# Patient Record
Sex: FEMALE | Race: BLACK OR AFRICAN AMERICAN | NOT HISPANIC OR LATINO | Employment: UNEMPLOYED | URBAN - METROPOLITAN AREA
[De-identification: names, ages, dates, MRNs, and addresses within clinical notes are randomized per-mention and may not be internally consistent; named-entity substitution may affect disease eponyms.]

---
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
## 2025-06-07 ENCOUNTER — HOSPITAL ENCOUNTER (EMERGENCY)
Facility: HOSPITAL | Age: 20
Discharge: HOME | End: 2025-06-07
Attending: STUDENT IN AN ORGANIZED HEALTH CARE EDUCATION/TRAINING PROGRAM
Payer: COMMERCIAL

## 2025-06-07 VITALS
HEART RATE: 70 BPM | HEIGHT: 67 IN | TEMPERATURE: 98 F | BODY MASS INDEX: 26.68 KG/M2 | OXYGEN SATURATION: 99 % | RESPIRATION RATE: 18 BRPM | WEIGHT: 170 LBS | SYSTOLIC BLOOD PRESSURE: 132 MMHG | DIASTOLIC BLOOD PRESSURE: 68 MMHG

## 2025-06-07 DIAGNOSIS — B96.89 BACTERIAL VAGINOSIS: ICD-10-CM

## 2025-06-07 DIAGNOSIS — R68.89 MULTIPLE COMPLAINTS: ICD-10-CM

## 2025-06-07 DIAGNOSIS — N30.00 ACUTE CYSTITIS WITHOUT HEMATURIA: ICD-10-CM

## 2025-06-07 DIAGNOSIS — A59.01 TRICHOMONAS VAGINITIS: ICD-10-CM

## 2025-06-07 DIAGNOSIS — N76.0 BACTERIAL VAGINOSIS: ICD-10-CM

## 2025-06-07 DIAGNOSIS — K80.20 CALCULUS OF GALLBLADDER WITHOUT CHOLECYSTITIS WITHOUT OBSTRUCTION: Primary | ICD-10-CM

## 2025-06-07 DIAGNOSIS — R94.31 ABNORMAL EKG: ICD-10-CM

## 2025-06-07 LAB
ABSOLUTE EOSINOPHIL (OHS): 0.29 K/UL
ABSOLUTE MONOCYTE (OHS): 0.85 K/UL (ref 0.3–1)
ABSOLUTE NEUTROPHIL COUNT (OHS): 6.69 K/UL (ref 1.8–7.7)
ALBUMIN SERPL BCP-MCNC: 3.9 G/DL (ref 3.5–5.2)
ALP SERPL-CCNC: 60 UNIT/L (ref 40–150)
ALT SERPL W/O P-5'-P-CCNC: 6 UNIT/L (ref 10–44)
ANION GAP (OHS): 8 MMOL/L (ref 8–16)
AST SERPL-CCNC: 15 UNIT/L (ref 11–45)
B-HCG UR QL: NEGATIVE
BACTERIA #/AREA URNS AUTO: ABNORMAL /HPF
BACTERIA GENITAL QL WET PREP: ABNORMAL
BASOPHILS # BLD AUTO: 0.03 K/UL
BASOPHILS NFR BLD AUTO: 0.3 %
BILIRUB SERPL-MCNC: 0.2 MG/DL (ref 0.1–1)
BILIRUB UR QL STRIP.AUTO: NEGATIVE
BUN SERPL-MCNC: 3 MG/DL (ref 6–30)
BUN SERPL-MCNC: 5 MG/DL (ref 6–20)
CALCIUM SERPL-MCNC: 9 MG/DL (ref 8.7–10.5)
CHLORIDE SERPL-SCNC: 101 MMOL/L (ref 95–110)
CHLORIDE SERPL-SCNC: 106 MMOL/L (ref 95–110)
CLARITY UR: ABNORMAL
CLUE CELLS VAG QL WET PREP: ABNORMAL
CO2 SERPL-SCNC: 27 MMOL/L (ref 23–29)
COLOR UR AUTO: YELLOW
CREAT SERPL-MCNC: 0.8 MG/DL (ref 0.5–1.4)
CREAT SERPL-MCNC: 0.8 MG/DL (ref 0.5–1.4)
CTP QC/QA: YES
ERYTHROCYTE [DISTWIDTH] IN BLOOD BY AUTOMATED COUNT: 13.5 % (ref 11.5–14.5)
FILAMENT FUNGI VAG WET PREP-#/AREA: ABNORMAL
GFR SERPLBLD CREATININE-BSD FMLA CKD-EPI: >60 ML/MIN/1.73/M2
GLUCOSE SERPL-MCNC: 76 MG/DL (ref 70–110)
GLUCOSE SERPL-MCNC: 79 MG/DL (ref 70–110)
GLUCOSE UR QL STRIP: NEGATIVE
HCT VFR BLD AUTO: 41.7 % (ref 37–48.5)
HCT VFR BLD CALC: 45 %PCV (ref 36–54)
HCV AB SERPL QL IA: NORMAL
HGB BLD-MCNC: 13.9 GM/DL (ref 12–16)
HGB UR QL STRIP: NEGATIVE
HIV 1+2 AB+HIV1 P24 AG SERPL QL IA: NORMAL
HOLD SPECIMEN: NORMAL
HOLD SPECIMEN: NORMAL
IMM GRANULOCYTES # BLD AUTO: 0.03 K/UL (ref 0–0.04)
IMM GRANULOCYTES NFR BLD AUTO: 0.3 % (ref 0–0.5)
KETONES UR QL STRIP: NEGATIVE
LEUKOCYTE ESTERASE UR QL STRIP: ABNORMAL
LIPASE SERPL-CCNC: 28 U/L (ref 4–60)
LYMPHOCYTES # BLD AUTO: 1.99 K/UL (ref 1–4.8)
MCH RBC QN AUTO: 32.8 PG (ref 27–31)
MCHC RBC AUTO-ENTMCNC: 33.3 G/DL (ref 32–36)
MCV RBC AUTO: 98 FL (ref 82–98)
MICROSCOPIC COMMENT: ABNORMAL
NITRITE UR QL STRIP: NEGATIVE
NUCLEATED RBC (/100WBC) (OHS): 0 /100 WBC
OHS QRS DURATION: 60 MS
OHS QTC CALCULATION: 385 MS
PH UR STRIP: 6 [PH]
PLATELET # BLD AUTO: 230 K/UL (ref 150–450)
PMV BLD AUTO: 9.9 FL (ref 9.2–12.9)
POC IONIZED CALCIUM: 1.19 MMOL/L (ref 1.06–1.42)
POC TCO2 (MEASURED): 25 MMOL/L (ref 23–29)
POTASSIUM BLD-SCNC: 3.7 MMOL/L (ref 3.5–5.1)
POTASSIUM SERPL-SCNC: 3.8 MMOL/L (ref 3.5–5.1)
PROT SERPL-MCNC: 6.8 GM/DL (ref 6–8.4)
PROT UR QL STRIP: NEGATIVE
RBC # BLD AUTO: 4.24 M/UL (ref 4–5.4)
RBC #/AREA URNS AUTO: 2 /HPF (ref 0–4)
RELATIVE EOSINOPHIL (OHS): 2.9 %
RELATIVE LYMPHOCYTE (OHS): 20.1 % (ref 18–48)
RELATIVE MONOCYTE (OHS): 8.6 % (ref 4–15)
RELATIVE NEUTROPHIL (OHS): 67.8 % (ref 38–73)
SAMPLE: ABNORMAL
SODIUM BLD-SCNC: 139 MMOL/L (ref 136–145)
SODIUM SERPL-SCNC: 141 MMOL/L (ref 136–145)
SP GR UR STRIP: 1.01
SQUAMOUS #/AREA URNS AUTO: <1 /HPF
T VAGINALIS GENITAL QL WET PREP: ABNORMAL
TROPONIN I SERPL HS-MCNC: <3 NG/L
UROBILINOGEN UR STRIP-ACNC: NEGATIVE EU/DL
WBC # BLD AUTO: 9.88 K/UL (ref 3.9–12.7)
WBC #/AREA URNS AUTO: 21 /HPF (ref 0–5)
WBC #/AREA VAG WET PREP: ABNORMAL
YEAST GENITAL QL WET PREP: ABNORMAL

## 2025-06-07 PROCEDURE — 81025 URINE PREGNANCY TEST: CPT

## 2025-06-07 PROCEDURE — 96372 THER/PROPH/DIAG INJ SC/IM: CPT

## 2025-06-07 PROCEDURE — 87389 HIV-1 AG W/HIV-1&-2 AB AG IA: CPT | Performed by: PHYSICIAN ASSISTANT

## 2025-06-07 PROCEDURE — 93010 ELECTROCARDIOGRAM REPORT: CPT | Mod: ,,, | Performed by: INTERNAL MEDICINE

## 2025-06-07 PROCEDURE — 80053 COMPREHEN METABOLIC PANEL: CPT

## 2025-06-07 PROCEDURE — 84484 ASSAY OF TROPONIN QUANT: CPT

## 2025-06-07 PROCEDURE — 63600175 PHARM REV CODE 636 W HCPCS

## 2025-06-07 PROCEDURE — 85025 COMPLETE CBC W/AUTO DIFF WBC: CPT

## 2025-06-07 PROCEDURE — 81003 URINALYSIS AUTO W/O SCOPE: CPT

## 2025-06-07 PROCEDURE — 87086 URINE CULTURE/COLONY COUNT: CPT

## 2025-06-07 PROCEDURE — 80047 BASIC METABLC PNL IONIZED CA: CPT

## 2025-06-07 PROCEDURE — 93005 ELECTROCARDIOGRAM TRACING: CPT

## 2025-06-07 PROCEDURE — 99285 EMERGENCY DEPT VISIT HI MDM: CPT | Mod: 25

## 2025-06-07 PROCEDURE — 86803 HEPATITIS C AB TEST: CPT | Performed by: PHYSICIAN ASSISTANT

## 2025-06-07 PROCEDURE — 87210 SMEAR WET MOUNT SALINE/INK: CPT

## 2025-06-07 PROCEDURE — 87591 N.GONORRHOEAE DNA AMP PROB: CPT

## 2025-06-07 PROCEDURE — 83690 ASSAY OF LIPASE: CPT

## 2025-06-07 PROCEDURE — 96360 HYDRATION IV INFUSION INIT: CPT

## 2025-06-07 RX ORDER — CEPHALEXIN 500 MG/1
500 CAPSULE ORAL 2 TIMES DAILY
Qty: 14 CAPSULE | Refills: 0 | Status: SHIPPED | OUTPATIENT
Start: 2025-06-07 | End: 2025-06-14

## 2025-06-07 RX ORDER — CEFTRIAXONE 500 MG/1
500 INJECTION, POWDER, FOR SOLUTION INTRAMUSCULAR; INTRAVENOUS
Status: COMPLETED | OUTPATIENT
Start: 2025-06-07 | End: 2025-06-07

## 2025-06-07 RX ORDER — METRONIDAZOLE 500 MG/1
500 TABLET ORAL 2 TIMES DAILY
Qty: 14 TABLET | Refills: 0 | Status: SHIPPED | OUTPATIENT
Start: 2025-06-07 | End: 2025-06-14

## 2025-06-07 RX ORDER — CEPHALEXIN 500 MG/1
500 CAPSULE ORAL 2 TIMES DAILY
Qty: 14 CAPSULE | Refills: 0 | Status: SHIPPED | OUTPATIENT
Start: 2025-06-07 | End: 2025-06-07

## 2025-06-07 RX ORDER — METRONIDAZOLE 500 MG/1
500 TABLET ORAL 2 TIMES DAILY
Qty: 14 TABLET | Refills: 0 | Status: SHIPPED | OUTPATIENT
Start: 2025-06-07 | End: 2025-06-07

## 2025-06-07 RX ADMIN — CEFTRIAXONE SODIUM 500 MG: 500 INJECTION, POWDER, FOR SOLUTION INTRAMUSCULAR; INTRAVENOUS at 02:06

## 2025-06-07 RX ADMIN — SODIUM CHLORIDE, POTASSIUM CHLORIDE, SODIUM LACTATE AND CALCIUM CHLORIDE 1000 ML: 600; 310; 30; 20 INJECTION, SOLUTION INTRAVENOUS at 10:06

## 2025-06-07 NOTE — ED PROVIDER NOTES
Encounter Date: 6/7/2025       History     Chief Complaint   Patient presents with    Vaginal Bleeding     Had period may 14, started again , r lower lung hurts with breathing     19-year-old female with no significant past medical history presents to the emergency department for 2 separate complaints.  She is complaining of right upper quadrant pain radiating to her right shoulder since yesterday.  Associated symptoms include decreased appetite and diarrhea.  She denies any nausea or vomiting.  She is also complaining of abnormal vaginal bleeding.  She states she had a normal menstrual cycle from May 14-19.  She reports vaginal bleeding since May 29th with suprapubic discomfort. Vaginal bleeding has improved since then. She has not noticed any vaginal discharge.  She is unsure if she is pregnant.  Denies any fever, chills, dysuria, urinary frequency/urgency        Review of patient's allergies indicates:  No Known Allergies  History reviewed. No pertinent past medical history.  History reviewed. No pertinent surgical history.  No family history on file.  Social History[1]  Review of Systems    Physical Exam     Initial Vitals [06/07/25 0842]   BP Pulse Resp Temp SpO2   (!) 179/86 (!) 112 18 98.5 °F (36.9 °C) 97 %      MAP       --         Physical Exam    Nursing note and vitals reviewed.  Constitutional: She appears well-developed and well-nourished.   HENT:   Head: Normocephalic and atraumatic.   Eyes: EOM are normal.   Neck: Neck supple.   Normal range of motion.  Cardiovascular:  Regular rhythm, normal heart sounds and intact distal pulses.   Tachycardia present.         Abdominal: Abdomen is soft. She exhibits no distension. There is abdominal tenderness in the right upper quadrant.   Positive Avila's sign   Genitourinary:    Genitourinary Comments: Pelvic: A female chaperone was present for this examination. Nl external inspection. No lesions or abnormalities were visible on the labia majora or minora.  Cervical os is closed. There is no CMT. There is slight blood in the vaginal vault. Malodorous milky pink discharge. No adnexal tenderness. No adnexal masses.        Musculoskeletal:         General: Normal range of motion.      Cervical back: Normal range of motion and neck supple.     Neurological: She is alert and oriented to person, place, and time. She has normal strength. GCS score is 15. GCS eye subscore is 4. GCS verbal subscore is 5. GCS motor subscore is 6.   Skin: Skin is warm and dry. Capillary refill takes less than 2 seconds.   Psychiatric: She has a normal mood and affect.         ED Course   Procedures  Labs Reviewed   WET PREP, GENITAL - Abnormal       Result Value    WBC Few (*)     Bacteria Many (*)     Clue Cells Moderate (*)     TRICHOMONAS  Many (*)     BUDDING YEAST None      FUNGAL HYPHAE None     COMPREHENSIVE METABOLIC PANEL - Abnormal    Sodium 141      Potassium 3.8      Chloride 106      CO2 27      Glucose 76      BUN 5 (*)     Creatinine 0.8      Calcium 9.0      Protein Total 6.8      Albumin 3.9      Bilirubin Total 0.2      ALP 60      AST 15      ALT 6 (*)     Anion Gap 8      eGFR >60     URINALYSIS, REFLEX TO URINE CULTURE - Abnormal    Color, UA Yellow      Appearance, UA Hazy (*)     pH, UA 6.0      Spec Grav UA 1.015      Protein, UA Negative      Glucose, UA Negative      Ketones, UA Negative      Bilirubin, UA Negative      Blood, UA Negative      Nitrites, UA Negative      Urobilinogen, UA Negative      Leukocyte Esterase, UA 2+ (*)    CBC WITH DIFFERENTIAL - Abnormal    WBC 9.88      RBC 4.24      HGB 13.9      HCT 41.7      MCV 98      MCH 32.8 (*)     MCHC 33.3      RDW 13.5      Platelet Count 230      MPV 9.9      Nucleated RBC 0      Neut % 67.8      Lymph % 20.1      Mono % 8.6      Eos % 2.9      Basophil % 0.3      Imm Grans % 0.3      Neut # 6.69      Lymph # 1.99      Mono # 0.85      Eos # 0.29      Baso # 0.03      Imm Grans # 0.03     URINALYSIS MICROSCOPIC -  Abnormal    RBC, UA 2      WBC, UA 21 (*)     Bacteria, UA Occasional      Squamous Epithelial Cells, UA <1      Microscopic Comment       ISTAT PROCEDURE - Abnormal    POC Glucose 79      POC BUN 3 (*)     POC Creatinine 0.8      POC Sodium 139      POC Potassium 3.7      POC Chloride 101      POC TCO2 (MEASURED) 25      POC Ionized Calcium 1.19      POC Hematocrit 45      Sample ERIK     HEPATITIS C ANTIBODY - Normal    Hep C Ab Interp Non-Reactive     HIV 1 / 2 ANTIBODY - Normal    HIV 1/2 Ag/Ab Non-Reactive     LIPASE - Normal    Lipase Level 28     TROPONIN I HIGH SENSITIVITY - Normal    Troponin High Sensitive <3     CULTURE, URINE   HEP C VIRUS HOLD SPECIMEN    Extra Tube Hold for add-ons.     CBC W/ AUTO DIFFERENTIAL    Narrative:     The following orders were created for panel order CBC W/ AUTO DIFFERENTIAL.  Procedure                               Abnormality         Status                     ---------                               -----------         ------                     CBC with Differential[[id number]]       Abnormal            Final result                 Please view results for these tests on the individual orders.   GREY TOP URINE HOLD    Extra Tube Hold for add-ons.     C. TRACHOMATIS/N. GONORRHOEAE BY AMP DNA   POCT URINE PREGNANCY    POC Preg Test, Ur Negative       Acceptable Yes     ISTAT CHEM8        ECG Results              EKG 12-lead (Final result)        Collection Time Result Time QRS Duration OHS QTC Calculation    06/07/25 08:47:11 06/07/25 09:00:52 60 385                     Final result by Interface, Lab In [facility] (06/07/25 09:00:58)                   Narrative:    Test Reason : R68.89,    Vent. Rate :  83 BPM     Atrial Rate :  83 BPM     P-R Int : 144 ms          QRS Dur :  60 ms      QT Int : 328 ms       P-R-T Axes :  39  58   8 degrees    QTcB Int : 385 ms    Normal sinus rhythm  Nonspecific ST and/or T wave abnormalities  Abnormal ECG  When compared with ECG  of 09-Oct-2024 00:04,  Inverted T waves have replaced nonspecific T wave abnormality in Inferior  leads  T wave inversion now evident in Anterior leads  Confirmed by Ruel Fuentes ([id number]) on 6/7/2025 9:00:51 AM    Referred By:            Confirmed By: Ruel Fuentes                                  Imaging Results              US Abdomen Limited (Final result)  Result time 06/07/25 12:36:29      Final result by Hope Awan MD (06/07/25 12:36:29)                   Impression:      Cholelithiasis without evidence of acute cholecystitis.    Electronically signed by resident: Debbie Montano  Date:    06/07/2025  Time:    12:25    Electronically signed by: Hope Awan MD  Date:    06/07/2025  Time:    12:36               Narrative:    EXAMINATION:  US ABDOMEN LIMITED FOR GALLBLADDER EVALUATION    CLINICAL HISTORY:  Abdominal Pain - Gallbladder;.    TECHNIQUE:  Limited ultrasound of the right upper quadrant of the abdomen including pancreas, gallbladder, common bile duct was performed.    COMPARISON:  None.    FINDINGS:  Gallbladder: Multiple mobile gallstones measuring up to 0.4 cm.  No gallbladder wall thickening or pericholecystic fluid to suggest acute cholecystitis.  No sonographic Avila sign.    Biliary system: The common duct is not dilated, measuring 3 mm.  No intrahepatic ductal dilatation.    Pancreas: The visualized portions of pancreas appear normal.                                       Medications   cefTRIAXone injection 500 mg (has no administration in time range)   lactated ringers bolus 1,000 mL (0 mLs Intravenous Stopped 6/7/25 1219)     Medical Decision Making  19-year-old female with no significant past medical history presents to the emergency department for RUQ pain radiating to R shoulder. Associated symptoms include decreased appetite and diarrhea. She is also complaining of abnormal vaginal bleeding. Physical exam as above    Based on available information and the initial  assessment, the working differential diagnoses considered during this evaluation include but are not limited to gastroenteritis, biliary obstruction, cholecystitis, trichomonas/chlamydia/gonorrhea, pregnancy, UTI.  Low suspicion for PID, nontender on pelvic exam    See ED course    Sending prescription for Flagyl to treat bacterial vaginosis and Trichomonas and prescription for Keflex to treat UTI.  Offered chlamydia and gonorrhea prophylaxis treatment.  Patient only wants the Rocephin shot here in ED, declines doxycycline.    EKG does have some T-wave inversions in leads V1 and V3 when compared to her last EKG in October 2024.  She is not having any chest pain or shortness of breath. Her heart rate improved after receiving 1 L fluids.  We will send a referral to Internal Medicine so that she can establish care with a primary care doctor to follow up on this.    I sent a referral to General surgery for cholelithiasis without acute cholecystitis.    Discussed with patient and family  all pertinent ED information and results. Discussed pt dx and plan of tx. Gave patient all f/u and return to the ED instructions. All questions and concerns were addressed at this time. Patient and family  expresses understanding of information and instructions, and is comfortable with plan to discharge. Pt is stable for discharge.    I discussed with patient and family that evaluation in the ED does not suggest any emergent or life threatening medical conditions requiring immediate intervention beyond what was provided in the ED, and I believe patient is safe for discharge.  Regardless, an unremarkable evaluation in the ED does not preclude the development or presence of a serious of life threatening condition. As such, it was instructed that the patient return immediately for any worsening or change in current symptoms.          Amount and/or Complexity of Data Reviewed  Labs: ordered. Decision-making details documented in ED  Course.  Radiology: ordered. Decision-making details documented in ED Course.    Risk  Prescription drug management.               ED Course as of 06/07/25 1352   Sat Jun 07, 2025   1045 Wet Prep, Genital (Vaginal Screen)(!)  Positive trichomonas and BV [ST]   1046 Trichomonas(!): Many [ST]   1047 Troponin I High Sensitivity: <3 [ST]   1047 Lipase: 28 [ST]   1123 hCG Qualitative, Urine: Negative [ST]   1123 Urinalysis, Reflex to Urine Culture Urine, Clean Catch(!)  2+ leuks [ST]   1123 Urinalysis Microscopic(!)  Positive WBC [ST]   1155 CBC W/ AUTO DIFFERENTIAL(!)  Absence of leukocytosis.  No anemia noted [ST]   1247 US Abdomen Limited  Impression:   Cholelithiasis without evidence of acute cholecystitis. [ST]      ED Course User Index  [ST] Marilee Coffman PA-C                           Clinical Impression:  Final diagnoses:  [R68.89] Multiple complaints  [A59.01] Trichomonas vaginitis  [N76.0, B96.89] Bacterial vaginosis  [N30.00] Acute cystitis without hematuria  [K80.20] Calculus of gallbladder without cholecystitis without obstruction (Primary)  [R94.31] Abnormal EKG          ED Disposition Condition    Discharge Stable          ED Prescriptions       Medication Sig Dispense Start Date End Date Auth. Provider    metroNIDAZOLE (FLAGYL) 500 MG tablet  (Status: Discontinued) Take 1 tablet (500 mg total) by mouth 2 (two) times daily. for 7 days 14 tablet 6/7/2025 6/7/2025 Marilee Coffman PA-C    cephALEXin (KEFLEX) 500 MG capsule  (Status: Discontinued) Take 1 capsule (500 mg total) by mouth 2 (two) times daily. for 7 days 14 capsule 6/7/2025 6/7/2025 Marilee Coffman PA-C    cephALEXin (KEFLEX) 500 MG capsule Take 1 capsule (500 mg total) by mouth 2 (two) times daily. for 7 days 14 capsule 6/7/2025 6/14/2025 Marilee Coffman PA-C    metroNIDAZOLE (FLAGYL) 500 MG tablet Take 1 tablet (500 mg total) by mouth 2 (two) times daily. for 7 days 14 tablet 6/7/2025 6/14/2025 Marilee Coffman PA-C          Follow-up Information        Follow up With Specialties Details Why Contact Info Additional Information    [facility] General Surgery Schedule an appointment as soon as possible for a visit in 1 week  [address]  [facility] [id number] [phone]     [facility] Emergency Medicine Go to  As needed [address]  [facility] [address] [phone]     [facility] B[facility] Internal Medicine Schedule an appointment as soon as possible for a visit in 1 week  [address]  [facility] [address] [phone] [facility] Please park in surface lot and check in at central registration desk                 Marilee Coffman PA-C  06/07/25 1352         [1]   Social History  Tobacco Use    Smoking status: Unknown        Marilee Coffman PA-C  06/07/25 1417

## 2025-06-07 NOTE — DISCHARGE INSTRUCTIONS
You tested positive for Trichomonas and bacterial vaginosis.  You also have a urinary tract infection.  I have sent a 7 day course of Flagyl and Keflex.  It is important to complete the entire course of antibiotics.  Do not drink alcohol with your antibiotics, it can make you really sick. Your ultrasound showed that you have gallstones in her gallbladder.  This is likely causing you to have that right upper belly pain that is going all the way to your shoulder.  You will need to follow-up with general surgery for this.  I put in a referral to general surgery and primary care for you to follow up.  If your symptoms worsen or you develop any new symptoms, report to the emergency department for further evaluation.

## 2025-06-07 NOTE — ED TRIAGE NOTES
Arrives via POV stating she has been having vaginal bleeding since may 26th. States her last cycle was on may 14th-18th. Pt states this morning she had some lower abd pain this morning. States there seems to be less bleeding today.

## 2025-06-07 NOTE — ED NOTES
I-STAT Chem-8+ Results:   Value Reference Range   Sodium 139 136-145 mmol/L   Potassium  3.7 3.5-5.1 mmol/L   Chloride 101  mmol/L   Ionized Calcium 1.19 1.06-1.42 mmol/L   CO2 (measured) 25 23-29 mmol/L   Glucose 79  mg/dL   BUN 3 6-30 mg/dL   Creatinine 0.8 0.5-1.4 mg/dL   Hematocrit 45 36-54%

## 2025-06-09 LAB
BACTERIA UR CULT: NORMAL
C TRACH DNA SPEC QL NAA+PROBE: DETECTED
CTGC SOURCE (OHS) ORD-325: ABNORMAL
N GONORRHOEA DNA UR QL NAA+PROBE: NOT DETECTED

## 2025-06-10 ENCOUNTER — RESULTS FOLLOW-UP (OUTPATIENT)
Dept: EMERGENCY MEDICINE | Facility: HOSPITAL | Age: 20
End: 2025-06-10
Payer: COMMERCIAL

## 2025-06-10 ENCOUNTER — TELEPHONE (OUTPATIENT)
Dept: SURGERY | Facility: CLINIC | Age: 20
End: 2025-06-10
Payer: COMMERCIAL

## 2025-06-17 ENCOUNTER — TELEPHONE (OUTPATIENT)
Dept: SURGERY | Facility: CLINIC | Age: 20
End: 2025-06-17
Payer: COMMERCIAL